# Patient Record
(demographics unavailable — no encounter records)

---

## 2024-10-28 NOTE — HISTORY OF PRESENT ILLNESS
[Delivery Date: ___] : on [unfilled] [] : delivered by vaginal delivery [Male] : Delivery History: baby boy [Wt. ___] : weighing [unfilled] [Breastfeeding] : currently nursing [None] : no vaginal bleeding [Normal] : the vagina was normal [Cervix Sample Taken] : cervical sample not taken for a Pap smear [Not Done] : Examination of breasts not done [Doing Well] : is doing well [No Sign of Infection] : is showing no signs of infection [Excellent Pain Control] : has excellent pain control [FreeTextEntry1] : Patient is a 37-year-old status post vaginal delivery on October 6 for viable male infant weighing 6 pounds 11 ounces Patient states she is breast-feeding Patient denies any difficulty passing flatus, voiding, or bowel movements Pelvic exam shows normal female external genitalia, vagina no lesions, cervix appropriate size nontender, uterus anteverted normal size nontender, adnexa no mass nontender Discussed at length contraception options including birth control pills, Depo-Provera, NuvaRing, IUD, vasectomy, tubal excision, Patient states she understands Patient will consider her options and return with her decision Follow-up 6 months to initiate annual visits full exam and Pap smear or prior to that as needed  Sanjay was present as a chaperone for the entire assessment and examination of this patient